# Patient Record
Sex: FEMALE | Race: WHITE | NOT HISPANIC OR LATINO | URBAN - METROPOLITAN AREA
[De-identification: names, ages, dates, MRNs, and addresses within clinical notes are randomized per-mention and may not be internally consistent; named-entity substitution may affect disease eponyms.]

---

## 2024-05-20 ENCOUNTER — APPOINTMENT (RX ONLY)
Dept: URBAN - METROPOLITAN AREA CLINIC 308 | Facility: CLINIC | Age: 29
Setting detail: DERMATOLOGY
End: 2024-05-20

## 2024-05-20 DIAGNOSIS — L73.9 FOLLICULAR DISORDER, UNSPECIFIED: ICD-10-CM | Status: INADEQUATELY CONTROLLED

## 2024-05-20 DIAGNOSIS — L663 OTHER SPECIFIED DISEASES OF HAIR AND HAIR FOLLICLES: ICD-10-CM | Status: INADEQUATELY CONTROLLED

## 2024-05-20 DIAGNOSIS — L72.11 PILAR CYST: ICD-10-CM

## 2024-05-20 DIAGNOSIS — L738 OTHER SPECIFIED DISEASES OF HAIR AND HAIR FOLLICLES: ICD-10-CM | Status: INADEQUATELY CONTROLLED

## 2024-05-20 PROBLEM — L02.426 FURUNCLE OF LEFT LOWER LIMB: Status: ACTIVE | Noted: 2024-05-20

## 2024-05-20 PROBLEM — L02.425 FURUNCLE OF RIGHT LOWER LIMB: Status: ACTIVE | Noted: 2024-05-20

## 2024-05-20 PROCEDURE — ? SURGICAL DECISION MAKING

## 2024-05-20 PROCEDURE — ? PRESCRIPTION

## 2024-05-20 PROCEDURE — 99204 OFFICE O/P NEW MOD 45 MIN: CPT

## 2024-05-20 PROCEDURE — ? ADDITIONAL NOTES

## 2024-05-20 PROCEDURE — ? COUNSELING

## 2024-05-20 RX ORDER — CLINDAMYCIN PHOSPHATE 10 MG/ML
LOTION TOPICAL QD
Qty: 60 | Refills: 11 | Status: ERX | COMMUNITY
Start: 2024-05-20

## 2024-05-20 RX ADMIN — CLINDAMYCIN PHOSPHATE: 10 LOTION TOPICAL at 00:00

## 2024-05-20 ASSESSMENT — LOCATION DETAILED DESCRIPTION DERM
LOCATION DETAILED: MID-OCCIPITAL SCALP
LOCATION DETAILED: RIGHT ANTERIOR PROXIMAL THIGH
LOCATION DETAILED: LEFT ANTERIOR PROXIMAL THIGH

## 2024-05-20 ASSESSMENT — LOCATION ZONE DERM
LOCATION ZONE: SCALP
LOCATION ZONE: LEG

## 2024-05-20 ASSESSMENT — LOCATION SIMPLE DESCRIPTION DERM
LOCATION SIMPLE: RIGHT THIGH
LOCATION SIMPLE: LEFT THIGH
LOCATION SIMPLE: POSTERIOR SCALP

## 2024-05-20 NOTE — PROCEDURE: ADDITIONAL NOTES
Additional Notes: Pt states she may want to get it excised in the future - outside of summer as she does half-marathons. Will call patient and provide cost estimate once we receive insurance verification back.
Render Risk Assessment In Note?: yes
Detail Level: Simple
Additional Notes: Discussed likely 2/2 trauma from running and shaving.  Also discussed possible ddx of HS.  Discussed treatment options to include Topical Clindamycin vs TAC injections vs short-course oral antibiotics along with the risks and benefits associated with each. Additionally recommended considering allowing the hair in this area to grow longer vs trial of [place] or Laser Hair Removal.  Recommended looser fitting clothing to run in.  Recommended cleansing the area with chlorohexidine or BP wash.  TAC injection per below as a courtesy.  \\n\\nLesions Injected: 1 (Right Anterior Medial Proximal Thigh)\\nMedication Injected: 10.0 mg/cc of Kenalog\\nTotal Volume Injected: 0.6cc\\n\\nNLA #: [id number]\\n\\nThe risks of atrophy were reviewed with the patient.  The patient's verbal consent was obtained.

## 2024-05-20 NOTE — PROCEDURE: SURGICAL DECISION MAKING
Complexity (Necessary For Coding; Major - 90 Day Global With Some Exceptions; Minor - 10 Day Global): minor
Identified Risk Factors Documented?: yes
Initial Decision For Surgery?: No
Discussion: Had long discussion with patient regarding risks, benefits, and alternatives related to procedure.
Risk Assessment Explanation (Does Not Render In The Note): Clinical determination of the probability and/or consequences of an event, such as surgery. Clinical assessment of the level of risk is affected by the nature of the event under consideration for the patient. Modifier 57 is used to indicate an Evaluation and Management (E/M) service resulted in the initial decision to perform surgery either the day before a major surgery (90 day global) or the day of a major surgery.